# Patient Record
Sex: FEMALE | Race: WHITE
[De-identification: names, ages, dates, MRNs, and addresses within clinical notes are randomized per-mention and may not be internally consistent; named-entity substitution may affect disease eponyms.]

---

## 2019-06-05 ENCOUNTER — HOSPITAL ENCOUNTER (INPATIENT)
Dept: HOSPITAL 91 - OBT | Age: 39
LOS: 3 days | Discharge: HOME | End: 2019-06-08
Payer: MEDICAID

## 2019-06-05 ENCOUNTER — HOSPITAL ENCOUNTER (INPATIENT)
Dept: HOSPITAL 10 - OBT | Age: 39
LOS: 3 days | Discharge: HOME | End: 2019-06-08
Attending: SPECIALIST | Admitting: SPECIALIST
Payer: MEDICAID

## 2019-06-05 VITALS — HEIGHT: 68 IN | BODY MASS INDEX: 33.81 KG/M2 | WEIGHT: 223.11 LBS

## 2019-06-05 VITALS — RESPIRATION RATE: 18 BRPM | SYSTOLIC BLOOD PRESSURE: 133 MMHG | HEART RATE: 89 BPM | DIASTOLIC BLOOD PRESSURE: 76 MMHG

## 2019-06-05 DIAGNOSIS — Z83.2: ICD-10-CM

## 2019-06-05 DIAGNOSIS — Z3A.40: ICD-10-CM

## 2019-06-05 PROCEDURE — 85018 HEMOGLOBIN: CPT

## 2019-06-05 PROCEDURE — 81001 URINALYSIS AUTO W/SCOPE: CPT

## 2019-06-05 PROCEDURE — 85730 THROMBOPLASTIN TIME PARTIAL: CPT

## 2019-06-05 PROCEDURE — 86900 BLOOD TYPING SEROLOGIC ABO: CPT

## 2019-06-05 PROCEDURE — 87340 HEPATITIS B SURFACE AG IA: CPT

## 2019-06-05 PROCEDURE — 85025 COMPLETE CBC W/AUTO DIFF WBC: CPT

## 2019-06-05 PROCEDURE — 90716 VAR VACCINE LIVE SUBQ: CPT

## 2019-06-05 PROCEDURE — 86592 SYPHILIS TEST NON-TREP QUAL: CPT

## 2019-06-05 PROCEDURE — 86850 RBC ANTIBODY SCREEN: CPT

## 2019-06-05 PROCEDURE — 85610 PROTHROMBIN TIME: CPT

## 2019-06-05 PROCEDURE — 0HQ9XZZ REPAIR PERINEUM SKIN, EXTERNAL APPROACH: ICD-10-PCS

## 2019-06-05 PROCEDURE — 86901 BLOOD TYPING SEROLOGIC RH(D): CPT

## 2019-06-05 PROCEDURE — 0HQ9XZZ REPAIR PERINEUM SKIN, EXTERNAL APPROACH: ICD-10-PCS | Performed by: OBSTETRICS & GYNECOLOGY

## 2019-06-05 PROCEDURE — 85014 HEMATOCRIT: CPT

## 2019-06-05 PROCEDURE — G0463 HOSPITAL OUTPT CLINIC VISIT: HCPCS

## 2019-06-05 NOTE — TRIAGE
===================================

OB Triage

===================================

Datetime Report Generated by CPN: 2019 23:49

   

   

===========================

Datetime: 2019 22:46

===========================

   

 Stage of Pregnancy:  OB Triage

   

===================================

Labor Evaluation

===================================

   

 Frequency:  2-5

 Monitor Mode:  External

 Duration (sec)2399:  60

 Quality:  Moderate

 Pattern:  Normal: <= 5 Contractions in 10 Minutes

 Resting Tone [place]:  Relaxed

   

===================================

Fetal Heart Rate

===================================

   

 FHR Baseline Rate:  130

 Monitor Mode:  External US

 FHR Baseline Changes:  No Baseline Change

 Variability:  Moderate 6-25 bpm

 Accelerations:  15X15

 Decelerations:  None

 Category:  Category I

   

===================================

Pain Assessment

===================================

   

 Pain Scale:  6

 Pain Presence:  Intermittent

 Pain Type:  Contraction

 Pain Location:  Abdomen

   

===================================

Vaginal Exam

===================================

   

 Dilatation (cms):  4.0

 Effacement (%):  70

 Station:  -2

 Exam By:  DI Acosta

 Membrane Status:  Intact

 Vaginal Bleeding:  Scant

 Cervix, Consistency:  Soft

 Cervix, Position:  Posterior

 Fetal Presentation 'A':  Cephalic

   

===========================

Datetime: 2019 22:25

===========================

   

 Time of Arrival:  2019 21:57

 EGA:  40.0

 Arrived By:  Ambulatory

 Arrived From:  Home

 Chief Complaint:   c/o ucs

 Fetal Movement:  Present

 Contractions:  Irregular

 Time Contractions Began:  2019 20:00

 Contractions:  q30

 Rupture of Membranes:  Denies

 Vaginal Bleeding:  None

 Vaginal Discharge:  Denies

 Recent Sexual Intercouse:  Denies

 Abdominal Trauma:  Not Applicable

 Patient Complaints:  Contractions

 Time Provider Notified:  2019 22:50

 Provider Notified:  Dr Curtis

 Initial Plan:  KYLE BRYANT

## 2019-06-06 VITALS — DIASTOLIC BLOOD PRESSURE: 67 MMHG | RESPIRATION RATE: 18 BRPM | SYSTOLIC BLOOD PRESSURE: 116 MMHG | HEART RATE: 74 BPM

## 2019-06-06 VITALS — HEART RATE: 74 BPM | DIASTOLIC BLOOD PRESSURE: 61 MMHG | RESPIRATION RATE: 16 BRPM | SYSTOLIC BLOOD PRESSURE: 106 MMHG

## 2019-06-06 VITALS — RESPIRATION RATE: 18 BRPM | DIASTOLIC BLOOD PRESSURE: 59 MMHG | HEART RATE: 83 BPM | SYSTOLIC BLOOD PRESSURE: 109 MMHG

## 2019-06-06 VITALS — RESPIRATION RATE: 18 BRPM | HEART RATE: 76 BPM | DIASTOLIC BLOOD PRESSURE: 50 MMHG | SYSTOLIC BLOOD PRESSURE: 105 MMHG

## 2019-06-06 LAB
ADD MAN DIFF?: NO
ADD UMIC: YES
BASOPHIL #: 0 10^3/UL (ref 0–0.1)
BASOPHILS %: 0.4 % (ref 0–2)
EOSINOPHILS #: 0.1 10^3/UL (ref 0–0.5)
EOSINOPHILS %: 0.6 % (ref 0–7)
HEMATOCRIT: 38.8 % (ref 37–47)
HEMATOCRIT: 42.1 % (ref 37–47)
HEMOGLOBIN: 12.8 G/DL (ref 12–16)
HEMOGLOBIN: 14.3 G/DL (ref 12–16)
HEPATITIS B SURFACE ANTIGEN: NEGATIVE
IMMATURE GRANS #M: 0.07 10^3/UL (ref 0–0.03)
IMMATURE GRANS % (M): 0.7 % (ref 0–0.43)
INR: 0.92
LYMPHOCYTES #: 1.8 10^3/UL (ref 0.8–2.9)
LYMPHOCYTES %: 18 % (ref 15–51)
MEAN CORPUSCULAR HEMOGLOBIN: 30.4 PG (ref 29–33)
MEAN CORPUSCULAR HGB CONC: 34 G/DL (ref 32–37)
MEAN CORPUSCULAR VOLUME: 89.6 FL (ref 82–101)
MEAN PLATELET VOLUME: 11.7 FL (ref 7.4–10.4)
MONOCYTE #: 0.7 10^3/UL (ref 0.3–0.9)
MONOCYTES %: 7.5 % (ref 0–11)
NEUTROPHIL #: 7.2 10^3/UL (ref 1.6–7.5)
NEUTROPHILS %: 72.8 % (ref 39–77)
NUCLEATED RED BLOOD CELLS #: 0 10^3/UL (ref 0–0)
NUCLEATED RED BLOOD CELLS%: 0 /100WBC (ref 0–0)
PARTIAL THROMBOPLASTIN TIME: 28.6 SEC (ref 23–35)
PLATELET COUNT: 186 10^3/UL (ref 140–415)
PROTIME: 12.5 SEC (ref 11.9–14.9)
PT RATIO: 1
RAPID PLASMA REAGIN: NONREACTIVE
RED BLOOD COUNT: 4.7 10^6/UL (ref 4.2–5.4)
RED CELL DISTRIBUTION WIDTH: 13.5 % (ref 11.5–14.5)
UR ASCORBIC ACID: NEGATIVE MG/DL
UR BACTERIA: (no result) /HPF
UR BILIRUBIN (DIP): NEGATIVE MG/DL
UR BLOOD (DIP): NEGATIVE MG/DL
UR CLARITY: CLEAR
UR COLOR: YELLOW
UR GLUCOSE (DIP): NEGATIVE MG/DL
UR KETONES (DIP): NEGATIVE MG/DL
UR LEUKOCYTE ESTERASE (DIP): (no result) LEU/UL
UR NITRITE (DIP): NEGATIVE MG/DL
UR PH (DIP): 6 (ref 5–9)
UR RBC: 1 /HPF (ref 0–5)
UR SPECIFIC GRAVITY (DIP): 1.01 (ref 1–1.03)
UR SQUAMOUS EPITHELIAL CELL: (no result) /HPF
UR TOTAL PROTEIN (DIP): NEGATIVE MG/DL
UR UROBILINOGEN (DIP): NEGATIVE MG/DL
UR WBC: 1 /HPF (ref 0–5)
WHITE BLOOD COUNT: 9.9 10^3/UL (ref 4.8–10.8)

## 2019-06-06 RX ADMIN — DOCUSATE SODIUM AND SENNOSIDES 1 TAB: 8.6; 5 TABLET, FILM COATED ORAL at 09:52

## 2019-06-06 RX ADMIN — IBUPROFEN 1 MG: 600 TABLET ORAL at 23:38

## 2019-06-06 RX ADMIN — Medication 1 MLS/HR: at 06:22

## 2019-06-06 RX ADMIN — IBUPROFEN SCH MG: 600 TABLET ORAL at 07:00

## 2019-06-06 RX ADMIN — IBUPROFEN SCH MG: 600 TABLET ORAL at 23:38

## 2019-06-06 RX ADMIN — Medication SCH MLS/HR: at 06:27

## 2019-06-06 RX ADMIN — DOCUSATE SODIUM AND SENNOSIDES SCH TAB: 8.6; 5 TABLET, FILM COATED ORAL at 23:38

## 2019-06-06 RX ADMIN — Medication 1 ML: at 06:41

## 2019-06-06 RX ADMIN — IBUPROFEN 1 MG: 600 TABLET ORAL at 06:40

## 2019-06-06 RX ADMIN — Medication 1 MLS/HR: at 06:28

## 2019-06-06 RX ADMIN — IBUPROFEN 1 MG: 600 TABLET ORAL at 07:00

## 2019-06-06 RX ADMIN — Medication SCH MLS/HR: at 06:22

## 2019-06-06 RX ADMIN — IBUPROFEN 1 MG: 600 TABLET ORAL at 18:19

## 2019-06-06 RX ADMIN — IBUPROFEN 1 MG: 600 TABLET ORAL at 12:45

## 2019-06-06 RX ADMIN — HYDROCODONE BITARTRATE AND ACETAMINOPHEN 1 TAB: 5; 325 TABLET ORAL at 09:52

## 2019-06-06 RX ADMIN — BUTORPHANOL TARTRATE 1 MG: 2 INJECTION, SOLUTION INTRAMUSCULAR; INTRAVENOUS at 04:26

## 2019-06-06 RX ADMIN — DOCUSATE SODIUM AND SENNOSIDES 1 TAB: 8.6; 5 TABLET, FILM COATED ORAL at 23:38

## 2019-06-06 RX ADMIN — Medication 1 MLS/HR: at 10:55

## 2019-06-06 RX ADMIN — LIDOCAINE HYDROCHLORIDE 1 ML: 10 INJECTION, SOLUTION EPIDURAL; INFILTRATION; INTRACAUDAL; PERINEURAL at 06:42

## 2019-06-06 RX ADMIN — PYRIDOXINE HYDROCHLORIDE 1 MLS/HR: 100 INJECTION, SOLUTION INTRAMUSCULAR; INTRAVENOUS at 00:06

## 2019-06-06 RX ADMIN — Medication 1 MLS/HR: at 06:27

## 2019-06-06 RX ADMIN — BUTORPHANOL TARTRATE 1 MG: 2 INJECTION, SOLUTION INTRAMUSCULAR; INTRAVENOUS at 00:16

## 2019-06-06 RX ADMIN — IBUPROFEN SCH MG: 600 TABLET ORAL at 18:19

## 2019-06-06 RX ADMIN — DOCUSATE SODIUM AND SENNOSIDES SCH TAB: 8.6; 5 TABLET, FILM COATED ORAL at 09:52

## 2019-06-06 RX ADMIN — IBUPROFEN SCH MG: 600 TABLET ORAL at 12:45

## 2019-06-06 NOTE — LDN
Date/Time of Note


Date/Time of Note


DATE: 6/6/19 


TIME: 06:42





Delivery Summary


June 6, 2019


Weeks of Gestation


40 weeks and 1 day


Placenta Delivered:  Spontaneously


Meconium:  none


Episiotomy:  No


Indication for episiotomy


N.A


Perineal laceration:  1


Laceration repair:  


first degree perineal laceration repaired using  3-0 chromic


Anesthesia type:  None


Estimated blood loss:  400


Sponge & Needle done & correct:  Yes


All needle counts correct:  Yes


Any foreign bodies felt in the:  No





Infant Delivery Information


Sex


Infant Sex:  male





Apgars


1 Minute:  8


5 Minute:  9





Suctioning


Nose & mouth suctioned at jayro:  Yes


Delee suction performed:  Yes





Umbilical Cord


Umbilical cord with:  3 Vessels


Cord presentations:  nuchal cord


Nuchal cord present X:  1


Cord Blood was obtained:  Yes





Mother & Baby Disposition


Disposition


Placenta delvered complete. Fundus was firm at the end of the delivery


Hemostasis complete


Patient tolerated the procedure well


No complication











TANVIR METZGER MD               Jun 6, 2019 06:44

## 2019-06-06 NOTE — HP
Date/Time of Note


Date/Time of Note


DATE: 19 


TIME: 00:39





OB - History


Hx of Present Pregnancy


Free Text/Dictation


late entry note for exam done for 2019


Chief Complaint:  labor pain


Estimated Due Date:  2019


:  7


Para:  4


Spontaneous :  2


Therapeutic :  0


Other Pregnancy Concerns:


38-year-old G7, P4 with IUP at 40 weeks with prenatal care with [facility] presented with a complaint of uterine contractions and was noted to be in


labor.


She denies any leaking of fluid, vaginal bleeding or decreased fetal movement.


Patient declines having epidural.  Antepartum course was complicated by history 


of hemophilia in 1 of her children.  Child is a boy.  Patient reports family 


history of hemophilia in her grandfather and cousin.  Patient reports her kids 


never had any blood transfusion but has easy bruising.


Pregnancy with a new partner.  Patient had not done genetic testing or carrier 


screening testing of her partner.  Current pregnancy has a male fetus.


She was noted to have regular contractions every 3 to 4 minutes.  NST was 


category 1.





Past Family/Social History


*


Past Medical, Surgical, Family and Obstetric Histories reviewed from prenatal 


chart.


Blood Type:  O+


Rubella:  unknown


RPR/VDRL:  Negative


GBS Status:  Negative


HBsAG:  Negative





OB  Admission Exam


Vital Signs


Vital Signs





Vital Signs


  Date      Temp  Pulse  Resp  B/P (MAP)   Pulse Ox  O2          O2 Flow    FiO2


Time                                                 Delivery    Rate


    19  98.3     89    18      133/76            Room Air


     22:52                           (95)








Physical Exam


HEENT:  WNL


Heart:  Rhythm Normal


Lungs:  Clear


Abdomen:  WNL


Reflexes:  Normal


Cervical Dilatation:  4cm


Effacement:  75%


Station:  -2


Membranes:  Intact


Fetal Heart Rate:  120's


Accelerations:  Accelerations Present


Decelerations:  No Decelerations


Varibility:  Moderate


Contractions on Admission:  < 5 Minutes Apart


Intensity:  Firm


Last 72 hours Lab Results


                                    CBC & BMP


19 00:03











OB  Assessment/Plan


Reason for admission:  active labor


Other Assessment:


IUP at 40 weeks


Active labor


GBS negative


History of hemophilia and 1 of her children as well as family history of 


hemophilia in her grandfather and cousin.


Pregnancy with a new partner.  Partner has not done any carrier screening test.


Baby is a male





Patient declined epidural.


Desires only IV pain medication if needed





Admit the patient to labor and delivery


Follow-up of labor care and anticipate .


Neonatologist will be notified about family history of hemophilia


Baby is male.


Consider less invasive procedure.  Caution during delivery avoiding of using FSE


of  vaccum.


Anticipate 


Plan Of care discussed with RN and with the patient











TANVIR METZGER MD               2019 00:45

## 2019-06-07 VITALS — HEART RATE: 71 BPM | RESPIRATION RATE: 16 BRPM | SYSTOLIC BLOOD PRESSURE: 107 MMHG | DIASTOLIC BLOOD PRESSURE: 54 MMHG

## 2019-06-07 VITALS — HEART RATE: 79 BPM | RESPIRATION RATE: 18 BRPM | SYSTOLIC BLOOD PRESSURE: 114 MMHG | DIASTOLIC BLOOD PRESSURE: 61 MMHG

## 2019-06-07 VITALS — DIASTOLIC BLOOD PRESSURE: 76 MMHG | HEART RATE: 81 BPM | RESPIRATION RATE: 20 BRPM | SYSTOLIC BLOOD PRESSURE: 132 MMHG

## 2019-06-07 VITALS — HEART RATE: 76 BPM | DIASTOLIC BLOOD PRESSURE: 56 MMHG | SYSTOLIC BLOOD PRESSURE: 112 MMHG | RESPIRATION RATE: 18 BRPM

## 2019-06-07 RX ADMIN — IBUPROFEN SCH MG: 600 TABLET ORAL at 12:14

## 2019-06-07 RX ADMIN — IBUPROFEN 1 MG: 600 TABLET ORAL at 12:14

## 2019-06-07 RX ADMIN — IBUPROFEN 1 MG: 600 TABLET ORAL at 17:38

## 2019-06-07 RX ADMIN — IBUPROFEN SCH MG: 600 TABLET ORAL at 06:02

## 2019-06-07 RX ADMIN — IBUPROFEN SCH MG: 600 TABLET ORAL at 17:38

## 2019-06-07 RX ADMIN — IBUPROFEN 1 MG: 600 TABLET ORAL at 06:02

## 2019-06-07 RX ADMIN — DOCUSATE SODIUM AND SENNOSIDES SCH TAB: 8.6; 5 TABLET, FILM COATED ORAL at 21:36

## 2019-06-07 RX ADMIN — DOCUSATE SODIUM AND SENNOSIDES SCH TAB: 8.6; 5 TABLET, FILM COATED ORAL at 08:50

## 2019-06-07 RX ADMIN — DOCUSATE SODIUM AND SENNOSIDES 1 TAB: 8.6; 5 TABLET, FILM COATED ORAL at 08:50

## 2019-06-07 RX ADMIN — DOCUSATE SODIUM AND SENNOSIDES 1 TAB: 8.6; 5 TABLET, FILM COATED ORAL at 21:36

## 2019-06-07 NOTE — DS
Date/Time of Note


Date/Time of Note


DATE: 19 


TIME: 17:00





Obstetrical Discharge Record


Final Diagnosis


Final Diagnosis:  Term delivered





Vaginal Delivery


Obstetrical Delivery:  Spontaneous





Complications


Augmentation:  No


Induction:  No


 Rupture of Membranes:  No





Condition on Discharge


Physical Assessment


Voiding:  Yes


Bowel Movement:  Yes


Breast:  Soft, non-tender, Filling


Fundus:  Firm


Abdomen and Incision:


soft, not tender


Calf Tenderness:  No


Patient Condition:  Good











MAXWELL MEJÍA MD             2019 17:01

## 2019-06-08 VITALS — SYSTOLIC BLOOD PRESSURE: 116 MMHG | DIASTOLIC BLOOD PRESSURE: 73 MMHG | RESPIRATION RATE: 18 BRPM | HEART RATE: 76 BPM

## 2019-06-08 VITALS — HEART RATE: 70 BPM | SYSTOLIC BLOOD PRESSURE: 116 MMHG | RESPIRATION RATE: 18 BRPM | DIASTOLIC BLOOD PRESSURE: 67 MMHG

## 2019-06-08 RX ADMIN — IBUPROFEN 1 MG: 600 TABLET ORAL at 12:15

## 2019-06-08 RX ADMIN — IBUPROFEN SCH MG: 600 TABLET ORAL at 12:15

## 2019-06-08 RX ADMIN — DOCUSATE SODIUM AND SENNOSIDES SCH TAB: 8.6; 5 TABLET, FILM COATED ORAL at 08:35

## 2019-06-08 RX ADMIN — IBUPROFEN SCH MG: 600 TABLET ORAL at 00:44

## 2019-06-08 RX ADMIN — CLOSTRIDIUM TETANI TOXOID ANTIGEN (FORMALDEHYDE INACTIVATED), CORYNEBACTERIUM DIPHTHERIAE TOXOID ANTIGEN (FORMALDEHYDE INACTIVATED), BORDETELLA PERTUSSIS TOXOID ANTIGEN (GLUTARALDEHYDE INACTIVATED), BORDETELLA PERTUSSIS FILAMENTOUS HEMAGGLUTININ ANTIGEN (FORMALDEHYDE INACTIVATED), BORDETELLA PERTUSSIS PERTACTIN ANTIGEN, AND BORDETELLA PERTUSSIS FIMBRIAE 2/3 ANTIGEN 1 ML: 5; 2; 2.5; 5; 3; 5 INJECTION, SUSPENSION INTRAMUSCULAR at 09:00

## 2019-06-08 RX ADMIN — VARICELLA VIRUS VACCINE LIVE 1 UNIT: 1350 INJECTION, POWDER, LYOPHILIZED, FOR SUSPENSION SUBCUTANEOUS at 09:00

## 2019-06-08 RX ADMIN — MEASLES, MUMPS, AND RUBELLA VIRUS VACCINE LIVE 1 ML: 1000; 12500; 1000 INJECTION, POWDER, LYOPHILIZED, FOR SUSPENSION SUBCUTANEOUS at 09:00

## 2019-06-08 RX ADMIN — IBUPROFEN SCH MG: 600 TABLET ORAL at 05:58

## 2019-06-08 RX ADMIN — IBUPROFEN 1 MG: 600 TABLET ORAL at 05:58

## 2019-06-08 RX ADMIN — DOCUSATE SODIUM AND SENNOSIDES 1 TAB: 8.6; 5 TABLET, FILM COATED ORAL at 08:35

## 2019-06-08 RX ADMIN — IBUPROFEN 1 MG: 600 TABLET ORAL at 00:44

## 2019-06-09 NOTE — DELSUM
===================================

Delivery Summary A-C

===================================

Datetime Report Generated by CPN: 2019 16:32

   

   

===================================

DELIVERY PERSONNEL

===================================

   

Circulator:  Ricafrente, Lashon

   

===================================

MATERNAL INFORMATION

===================================

   

Delivery Anesthesia:  Local

Medications in Delivery:   + 30 units pitocin

Delivery QBL (ml):  400

Placenta Cultured:  No

Maternal Complications:  None

   

===================================

LABOR SUMMARY

===================================

   

EDC:  2019 00:00

No. Babies in Womb:  1

 Attempted:  No

Labor Anesthesia:  IV Sedation

   

===================================

LABOR INFORMATION

===================================

   

Reason for Induction:  Not Applicable

Onset of Labor:  2019 20:00

Complete Dilatation:  2019 06:01

Other Ripening Agents:  N/A

Oxytocin:  N/A

Group B Beta Strep:  Negative

Antibiotics # of Doses:  0

Steroids Given:  None

Reason Steroids Not Administered:  Not Applicable

   

===================================

MEMBRANES

===================================

   

Membranes Rupture Method:  Spontaneous

Rupture of Membranes:  2019 06:01

Length of Rupture (hr):  0.08

Amniotic Fluid Color:  Clear

Amniotic Fluid Amount:  Moderate

Amniotic Fluid Odor:  Normal

   

===================================

STAGES OF LABOR

===================================

   

Stage 1 hr:  10

Stage 1 min:  1

Stage 2 hr:  0

Stage 2 min:  5

Stage 3 hr:  0

Stage 3 min:  3

Total Time in Labor hr:  10

Total Time in Labor min:  9

   

===================================

VAGINAL DELIVERY

===================================

   

Episiotomy:  None

Laceration Extension:  First Degree

Laceration Type:  Perineal

Laceration Repair:  Yes

Initial Vag Sponge Count:  10

Final Vag Sponge Count:  10

Initial Vag Sharps Count:  2

Final Vag Sharps Count:  2

Sponge Count Correct:  Yes

Sharps Count Correct:  Yes

   

===================================

BABY A INFORMATION

===================================

   

Infant Delivery Date/Time:  2019 06:06

Method of Delivery:  Vaginal

Born in Route :  No

:  N/A

Forceps:  N/A

Vacuum Extraction:  N/A

Shoulder Dystocia :  N/A

   

===================================

SHOULDER DYSTOCIA BABY A

===================================

   

Infant Delivery Date/Time:  2019 06:06

   

===================================

PRESENTATION/POSITION BABY A

===================================

   

Presentation:  Cephalic

Cephalic Presentation:  Vertex

Vertex Position:  Left Occipital Anterior

Breech Presentation:  N/A

   

===================================

PLACENTA INFORMATION BABY A

===================================

   

Placenta Delivery Time :  2019 06:09

Placenta Method of Delivery:  Spontaneous

Placenta Status:  Delivered

   

===================================

APGAR SCORES BABY A

===================================

   

Heart Rate 1 min:  >100 bpm

Resp Effort 1 min:  Good Cry

Reflex Irritability 1 min:  Cough/Sneeze/Pulls Away

Muscle Tone 1 min:  Active Motion

Color 1 min:  Blue/Pale

Resuscitation Effort 1 min:  Tactile Stimulation

APGAR SCORE 1 MIN:  8

Heart Rate 5 min:  >100 bpm

Resp Effort 5 min:  Good Cry

Reflex Irritability 5 min:  Cough/Sneeze/Pulls Away

Muscle Tone 5 min:  Active Motion

Color 5 min:  Body Pink, Extremit Blue

Resuscitation Effort 5 min:  Tactile Stimulation

APGAR SCORE 5 MIN:  9

   

===================================

INFANT INFORMATION BABY A

===================================

   

Gestational Age at Delivery:  40.1

Gestational Status:  Full Term- 39- 40.6 Weeks

Infant Outcome :  Liveborn

Infant Condition :  Stable

Infant Sex:  Male

   

===================================

IDENTIFICATION/MEDS BABY A

===================================

   

ID Band Number:  [id number]

ID Band Location:  Right Leg; Left Arm



Sensor Applied:  Yes

Sensor Number:  [id number]

Sensor Location :  Cord Clamp

Vitamin K Given :  Not Given

Erythromycin Given:  Not Given

   

===================================

WEIGHT/LENGTH BABY A

===================================

   

Infant Birthweight (gm):  3970

Infant Weight (lb):  8

Infant Weight (oz):  12

Infant Length (in):  20.00

Infant Length (cm):  50.80

   

===================================

CORD INFORMATION BABY A

===================================

   

No. Cord Vessels:  3

Nuchal Cord :  Around Neck x1, Loose

Nuchal Cord- Other:  0

True Knot:  0

Cord Blood Taken:  Yes

Banking/Donate Info:  No

Infant Suction:  Mouth; Nose

   

===================================

ASSESSMENT BABY A

===================================

   

Infant Complications:  None

Physical Findings at Delivery:  Within Normal Limits

Physical Findings- Other:  10ml mucous suctioned

Infant Respirations:  Appears Normal

Neonatologist/ALS Called :  No

Infant Care By:  Ranjeet Rincon RN

Transferred To:  Remains with Mother